# Patient Record
Sex: MALE | Race: BLACK OR AFRICAN AMERICAN | NOT HISPANIC OR LATINO | Employment: FULL TIME | URBAN - METROPOLITAN AREA
[De-identification: names, ages, dates, MRNs, and addresses within clinical notes are randomized per-mention and may not be internally consistent; named-entity substitution may affect disease eponyms.]

---

## 2023-12-07 ENCOUNTER — HOSPITAL ENCOUNTER (EMERGENCY)
Facility: HOSPITAL | Age: 33
Discharge: HOME | End: 2023-12-08
Attending: EMERGENCY MEDICINE
Payer: MEDICAID

## 2023-12-07 VITALS
OXYGEN SATURATION: 97 % | HEIGHT: 71 IN | WEIGHT: 150 LBS | HEART RATE: 94 BPM | RESPIRATION RATE: 16 BRPM | TEMPERATURE: 100 F | SYSTOLIC BLOOD PRESSURE: 134 MMHG | BODY MASS INDEX: 21 KG/M2 | DIASTOLIC BLOOD PRESSURE: 91 MMHG

## 2023-12-07 DIAGNOSIS — Z11.51 ENCOUNTER FOR SCREENING FOR HUMAN PAPILLOMAVIRUS (HPV): Primary | ICD-10-CM

## 2023-12-07 PROCEDURE — 99283 EMERGENCY DEPT VISIT LOW MDM: CPT | Performed by: EMERGENCY MEDICINE

## 2023-12-07 PROCEDURE — 99281 EMR DPT VST MAYX REQ PHY/QHP: CPT | Performed by: EMERGENCY MEDICINE

## 2023-12-07 ASSESSMENT — COLUMBIA-SUICIDE SEVERITY RATING SCALE - C-SSRS
2. HAVE YOU ACTUALLY HAD ANY THOUGHTS OF KILLING YOURSELF?: NO
6. HAVE YOU EVER DONE ANYTHING, STARTED TO DO ANYTHING, OR PREPARED TO DO ANYTHING TO END YOUR LIFE?: NO
1. IN THE PAST MONTH, HAVE YOU WISHED YOU WERE DEAD OR WISHED YOU COULD GO TO SLEEP AND NOT WAKE UP?: NO

## 2023-12-08 NOTE — ED PROVIDER NOTES
HPI:  Patient is a 33-year-old male who presents requesting HPV testing.  Patient states his girlfriend had a positive HPV test with numerous strains on her recent Pap smear and requested for him to get tested for this.  He denies hematuria, dysuria or penile discharge.  No back pain.  No genital warts or other lesions.    ROS: A 10-system ROS was performed and was negative except as documented in the HPI.    PMH/PSH: Reviewed in EMR. As above in HPI.  SH: Denies EtOH, tobacco or illicit drug use.  Allergies: No Known Allergies   Medications: See prescription writer for full medication list.     General: no acute distress, appropriate conversation  HEENT:  No rhinorrhea. MMM.  Cardiac: regular rate rhythm, no murmurs  Pulm:  normal respiratory effort on room air, equal chest expansion, clear bilaterally, no wheeze or crackles  GI: soft, nontender, nondistended, +BS  : deferred  Extremities:  moves all extremities freely, no edema noted  Skin: warm, well-perfused, no lesions noted on exposed skin.  Neuro:  AOx3, moves all 4 extremities freely and independently     Assessment/Plan/MDM  Patient is a 33-year-old male who presents requesting HPV testing.  Patient advised that we d do not provide this testing from the emergency department.  He recently had STI, HIV and syphilis testing at urgent care center all of which were negative.  Patient discharged in stable condition with HPV education.    ED Course/Progress:    Diagnoses as of 12/09/23 2345   Encounter for screening for human papillomavirus (HPV)        Clinical Impression: as above  Dispo:   Home: I discussed the differential, results and discharge plan with the patient. I explained reasons for the patient to return to the Emergency Department.  Questions were addressed.  They understand return precautions and discharge instructions. The patient expressed understanding and agreement with assessment/plan.     Pt seen and discussed with attending physician,   .  Lacie Sewell MD  PGY3, Emergency Medicine    Disclaimer: This note was dictated by speech recognition. An attempt at proof reading was made to minimize errors. Errors in transcription may be present.  Please call if questions.      Lacie Sewell MD  Resident  12/09/23 6922

## 2023-12-08 NOTE — DISCHARGE INSTRUCTIONS
We do not offer HPV testing from the emergency department.  HPV is more concerning in women however everyone is a carrier.  You do not need to follow-up with urology, as you do not have any symptoms.

## 2024-04-09 ENCOUNTER — HOSPITAL ENCOUNTER (EMERGENCY)
Facility: HOSPITAL | Age: 34
Discharge: HOME | End: 2024-04-09
Attending: STUDENT IN AN ORGANIZED HEALTH CARE EDUCATION/TRAINING PROGRAM
Payer: MEDICAID

## 2024-04-09 VITALS
HEIGHT: 71 IN | BODY MASS INDEX: 23.1 KG/M2 | TEMPERATURE: 99.3 F | SYSTOLIC BLOOD PRESSURE: 143 MMHG | OXYGEN SATURATION: 96 % | DIASTOLIC BLOOD PRESSURE: 95 MMHG | HEART RATE: 93 BPM | RESPIRATION RATE: 18 BRPM | WEIGHT: 165 LBS

## 2024-04-09 DIAGNOSIS — M79.604 RIGHT LEG PAIN: Primary | ICD-10-CM

## 2024-04-09 DIAGNOSIS — Z87.39 H/O RADICULOPATHY: ICD-10-CM

## 2024-04-09 PROCEDURE — 2500000004 HC RX 250 GENERAL PHARMACY W/ HCPCS (ALT 636 FOR OP/ED): Mod: SE

## 2024-04-09 PROCEDURE — 99283 EMERGENCY DEPT VISIT LOW MDM: CPT

## 2024-04-09 PROCEDURE — 99284 EMERGENCY DEPT VISIT MOD MDM: CPT

## 2024-04-09 PROCEDURE — 96372 THER/PROPH/DIAG INJ SC/IM: CPT

## 2024-04-09 PROCEDURE — 2500000001 HC RX 250 WO HCPCS SELF ADMINISTERED DRUGS (ALT 637 FOR MEDICARE OP): Mod: SE

## 2024-04-09 RX ORDER — BACLOFEN 10 MG/1
5 TABLET ORAL ONCE
Status: COMPLETED | OUTPATIENT
Start: 2024-04-09 | End: 2024-04-09

## 2024-04-09 RX ORDER — BACLOFEN 10 MG/1
5 TABLET ORAL 3 TIMES DAILY
Qty: 21 TABLET | Refills: 0 | Status: SHIPPED | OUTPATIENT
Start: 2024-04-09 | End: 2024-04-23

## 2024-04-09 RX ORDER — KETOROLAC TROMETHAMINE 30 MG/ML
30 INJECTION, SOLUTION INTRAMUSCULAR; INTRAVENOUS ONCE
Status: COMPLETED | OUTPATIENT
Start: 2024-04-09 | End: 2024-04-09

## 2024-04-09 RX ADMIN — BACLOFEN 5 MG: 10 TABLET ORAL at 23:40

## 2024-04-09 RX ADMIN — KETOROLAC TROMETHAMINE 30 MG: 30 INJECTION, SOLUTION INTRAMUSCULAR; INTRAVENOUS at 21:14

## 2024-04-09 ASSESSMENT — COLUMBIA-SUICIDE SEVERITY RATING SCALE - C-SSRS
2. HAVE YOU ACTUALLY HAD ANY THOUGHTS OF KILLING YOURSELF?: NO
1. IN THE PAST MONTH, HAVE YOU WISHED YOU WERE DEAD OR WISHED YOU COULD GO TO SLEEP AND NOT WAKE UP?: NO
6. HAVE YOU EVER DONE ANYTHING, STARTED TO DO ANYTHING, OR PREPARED TO DO ANYTHING TO END YOUR LIFE?: NO

## 2024-04-09 ASSESSMENT — LIFESTYLE VARIABLES
EVER FELT BAD OR GUILTY ABOUT YOUR DRINKING: NO
TOTAL SCORE: 0
HAVE YOU EVER FELT YOU SHOULD CUT DOWN ON YOUR DRINKING: NO
HAVE PEOPLE ANNOYED YOU BY CRITICIZING YOUR DRINKING: NO
EVER HAD A DRINK FIRST THING IN THE MORNING TO STEADY YOUR NERVES TO GET RID OF A HANGOVER: NO

## 2024-04-09 NOTE — Clinical Note
Hosea Whiting was seen and treated in our emergency department on 4/9/2024.  He may return to work on 04/12/2024.       If you have any questions or concerns, please don't hesitate to call.      Dinh Gilman MD
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
## 2024-04-10 RX ORDER — CAPSAICIN AND MENTHOL .0225; 4.5 G/1; G/1
1 PATCH TOPICAL DAILY
Qty: 14 PATCH | Refills: 0 | Status: SHIPPED | OUTPATIENT
Start: 2024-04-10
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
## 2024-04-10 NOTE — DISCHARGE INSTRUCTIONS
Follow-up with the physician referral line at [phone], request for physical medicine.  Follow-up with the orthopedic clinic at [phone].  Take your baclofen as prescribed.
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
## 2024-06-17 ENCOUNTER — APPOINTMENT (OUTPATIENT)
Dept: RADIOLOGY | Facility: HOSPITAL | Age: 34
End: 2024-06-17
Payer: MEDICAID

## 2024-06-17 ENCOUNTER — HOSPITAL ENCOUNTER (EMERGENCY)
Facility: HOSPITAL | Age: 34
Discharge: LEFT BEFORE DISCHARGE | End: 2024-06-17
Attending: STUDENT IN AN ORGANIZED HEALTH CARE EDUCATION/TRAINING PROGRAM
Payer: MEDICAID

## 2024-06-17 VITALS
WEIGHT: 165 LBS | HEIGHT: 71 IN | TEMPERATURE: 98.5 F | HEART RATE: 75 BPM | DIASTOLIC BLOOD PRESSURE: 72 MMHG | SYSTOLIC BLOOD PRESSURE: 115 MMHG | OXYGEN SATURATION: 99 % | RESPIRATION RATE: 16 BRPM | BODY MASS INDEX: 23.1 KG/M2

## 2024-06-17 DIAGNOSIS — F10.920 ALCOHOLIC INTOXICATION WITHOUT COMPLICATION (CMS-HCC): Primary | ICD-10-CM

## 2024-06-17 LAB
ALBUMIN SERPL BCP-MCNC: 4.5 G/DL (ref 3.4–5)
ALP SERPL-CCNC: 72 U/L (ref 33–120)
ALT SERPL W P-5'-P-CCNC: 13 U/L (ref 10–52)
ANION GAP SERPL CALC-SCNC: 16 MMOL/L (ref 10–20)
AST SERPL W P-5'-P-CCNC: 21 U/L (ref 9–39)
BASOPHILS # BLD AUTO: 0.02 X10*3/UL (ref 0–0.1)
BASOPHILS NFR BLD AUTO: 0.4 %
BILIRUB SERPL-MCNC: 0.4 MG/DL (ref 0–1.2)
BUN SERPL-MCNC: 7 MG/DL (ref 6–23)
CALCIUM SERPL-MCNC: 9.5 MG/DL (ref 8.6–10.6)
CHLORIDE SERPL-SCNC: 108 MMOL/L (ref 98–107)
CO2 SERPL-SCNC: 24 MMOL/L (ref 21–32)
CREAT SERPL-MCNC: 0.9 MG/DL (ref 0.5–1.3)
EGFRCR SERPLBLD CKD-EPI 2021: >90 ML/MIN/1.73M*2
EOSINOPHIL # BLD AUTO: 0.17 X10*3/UL (ref 0–0.7)
EOSINOPHIL NFR BLD AUTO: 3.1 %
ERYTHROCYTE [DISTWIDTH] IN BLOOD BY AUTOMATED COUNT: 13.9 % (ref 11.5–14.5)
GLUCOSE SERPL-MCNC: 97 MG/DL (ref 74–99)
HCT VFR BLD AUTO: 42.4 % (ref 41–52)
HGB BLD-MCNC: 15 G/DL (ref 13.5–17.5)
IMM GRANULOCYTES # BLD AUTO: 0.02 X10*3/UL (ref 0–0.7)
IMM GRANULOCYTES NFR BLD AUTO: 0.4 % (ref 0–0.9)
LYMPHOCYTES # BLD AUTO: 1.67 X10*3/UL (ref 1.2–4.8)
LYMPHOCYTES NFR BLD AUTO: 30.2 %
MAGNESIUM SERPL-MCNC: 2.28 MG/DL (ref 1.6–2.4)
MCH RBC QN AUTO: 31.8 PG (ref 26–34)
MCHC RBC AUTO-ENTMCNC: 35.4 G/DL (ref 32–36)
MCV RBC AUTO: 90 FL (ref 80–100)
MONOCYTES # BLD AUTO: 0.47 X10*3/UL (ref 0.1–1)
MONOCYTES NFR BLD AUTO: 8.5 %
NEUTROPHILS # BLD AUTO: 3.18 X10*3/UL (ref 1.2–7.7)
NEUTROPHILS NFR BLD AUTO: 57.4 %
NRBC BLD-RTO: 0 /100 WBCS (ref 0–0)
PHOSPHATE SERPL-MCNC: 4.8 MG/DL (ref 2.5–4.9)
PLATELET # BLD AUTO: 239 X10*3/UL (ref 150–450)
POTASSIUM SERPL-SCNC: 3.8 MMOL/L (ref 3.5–5.3)
PROT SERPL-MCNC: 6.8 G/DL (ref 6.4–8.2)
RBC # BLD AUTO: 4.72 X10*6/UL (ref 4.5–5.9)
SODIUM SERPL-SCNC: 144 MMOL/L (ref 136–145)
WBC # BLD AUTO: 5.5 X10*3/UL (ref 4.4–11.3)

## 2024-06-17 PROCEDURE — 70450 CT HEAD/BRAIN W/O DYE: CPT

## 2024-06-17 PROCEDURE — 73502 X-RAY EXAM HIP UNI 2-3 VIEWS: CPT | Mod: LEFT SIDE | Performed by: RADIOLOGY

## 2024-06-17 PROCEDURE — 73502 X-RAY EXAM HIP UNI 2-3 VIEWS: CPT | Mod: LT

## 2024-06-17 PROCEDURE — 36415 COLL VENOUS BLD VENIPUNCTURE: CPT | Performed by: STUDENT IN AN ORGANIZED HEALTH CARE EDUCATION/TRAINING PROGRAM

## 2024-06-17 PROCEDURE — 80053 COMPREHEN METABOLIC PANEL: CPT | Performed by: STUDENT IN AN ORGANIZED HEALTH CARE EDUCATION/TRAINING PROGRAM

## 2024-06-17 PROCEDURE — 73564 X-RAY EXAM KNEE 4 OR MORE: CPT | Mod: RIGHT SIDE | Performed by: RADIOLOGY

## 2024-06-17 PROCEDURE — 85025 COMPLETE CBC W/AUTO DIFF WBC: CPT | Performed by: STUDENT IN AN ORGANIZED HEALTH CARE EDUCATION/TRAINING PROGRAM

## 2024-06-17 PROCEDURE — 83735 ASSAY OF MAGNESIUM: CPT | Performed by: STUDENT IN AN ORGANIZED HEALTH CARE EDUCATION/TRAINING PROGRAM

## 2024-06-17 PROCEDURE — 70450 CT HEAD/BRAIN W/O DYE: CPT | Performed by: RADIOLOGY

## 2024-06-17 PROCEDURE — 73564 X-RAY EXAM KNEE 4 OR MORE: CPT | Mod: RT

## 2024-06-17 PROCEDURE — 84100 ASSAY OF PHOSPHORUS: CPT | Performed by: STUDENT IN AN ORGANIZED HEALTH CARE EDUCATION/TRAINING PROGRAM

## 2024-06-17 PROCEDURE — 99284 EMERGENCY DEPT VISIT MOD MDM: CPT

## 2024-06-17 ASSESSMENT — PAIN DESCRIPTION - PAIN TYPE: TYPE: ACUTE PAIN

## 2024-06-17 ASSESSMENT — LIFESTYLE VARIABLES
EVER HAD A DRINK FIRST THING IN THE MORNING TO STEADY YOUR NERVES TO GET RID OF A HANGOVER: NO
TOTAL SCORE: 0
EVER FELT BAD OR GUILTY ABOUT YOUR DRINKING: NO
HAVE PEOPLE ANNOYED YOU BY CRITICIZING YOUR DRINKING: NO
HAVE YOU EVER FELT YOU SHOULD CUT DOWN ON YOUR DRINKING: NO

## 2024-06-17 ASSESSMENT — PAIN SCALES - GENERAL: PAINLEVEL_OUTOF10: 8

## 2024-06-17 ASSESSMENT — PAIN - FUNCTIONAL ASSESSMENT: PAIN_FUNCTIONAL_ASSESSMENT: 0-10

## 2024-06-17 ASSESSMENT — PAIN DESCRIPTION - ORIENTATION: ORIENTATION: RIGHT

## 2024-06-17 ASSESSMENT — COLUMBIA-SUICIDE SEVERITY RATING SCALE - C-SSRS
1. IN THE PAST MONTH, HAVE YOU WISHED YOU WERE DEAD OR WISHED YOU COULD GO TO SLEEP AND NOT WAKE UP?: NO
6. HAVE YOU EVER DONE ANYTHING, STARTED TO DO ANYTHING, OR PREPARED TO DO ANYTHING TO END YOUR LIFE?: NO
2. HAVE YOU ACTUALLY HAD ANY THOUGHTS OF KILLING YOURSELF?: NO

## 2024-06-17 ASSESSMENT — PAIN DESCRIPTION - LOCATION: LOCATION: HIP

## 2024-06-17 ASSESSMENT — PAIN DESCRIPTION - PROGRESSION: CLINICAL_PROGRESSION: NOT CHANGED
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
## 2024-06-17 NOTE — ED PROVIDER NOTES
CC: Acute Intoxication     HPI:  Hosea Whiting is a 34 y.o. male with a past medical history of multiple surgical procedures on bilateral lower extremities, remote, presenting to the emergency department today due to concerns for intoxication.  Per EMS report, the patient called 911 due to concerns that his right leg was giving out.  Patient was initially alert and oriented x 4 and responsive however became somnolent and alert and oriented x 1 and wrapped.  Per EMS, this all happened after the patient had several shots of alcohol and used marijuana.  Patient states that he is not able to speak or communicate.  He states that this occurred and arrived to the hospital.  He states that this is never happened before.  He states that he is having pain over his right lower extremity.  He states he feels as though his knee gave out on him.  Denies any recent fevers, chills, nausea, vomiting.  States that he did not injure himself when his leg gave out on him.  Denies any numbness, tingling, weakness.  Denies any history of high blood pressure, diabetes, or other concerns.    Limitations to History:  Patient difficulty forming words communication was done via written paper  Additional History provided by: N/A    External Records Reviewed:  Recent available ED and inpatient notes reviewed in EMR.    PMHx/PSHx:  Per HPI.   -does not have a problem list on file.    - has no past surgical history on file.    Medications:  Reviewed in EMR. See EMR for complete list of medications and doses.    Allergies:  Amoxicillin, Codeine hcl, and Fish derived    Social History:  - Tobacco:  has no history on file for tobacco use.   - Alcohol:  has no history on file for alcohol use.   - Illicit Drugs:  has no history on file for drug use.     ROS:  Per HPI.     ???????????????????????????????????????????????????????????????  Triage Vitals:  T 36.9 °C (98.5 °F)    BP (!) 144/94  RR 16  O2 96 % None (Room air)    Physical  Exam  Vitals and nursing note reviewed.   Constitutional:       General: He is not in acute distress.     Appearance: He is well-developed.   HENT:      Head: Normocephalic and atraumatic.   Eyes:      Conjunctiva/sclera: Conjunctivae normal.   Cardiovascular:      Rate and Rhythm: Normal rate and regular rhythm.      Heart sounds: No murmur heard.  Pulmonary:      Effort: Pulmonary effort is normal. No respiratory distress.      Breath sounds: Normal breath sounds.   Abdominal:      Palpations: Abdomen is soft.      Tenderness: There is no abdominal tenderness.   Musculoskeletal:         General: Tenderness (Mild tenderness over the knee with full range of motion) present. No swelling, deformity or signs of injury.      Cervical back: Neck supple.      Right lower leg: No edema.      Left lower leg: No edema.   Skin:     General: Skin is warm and dry.      Capillary Refill: Capillary refill takes less than 2 seconds.   Neurological:      Mental Status: He is alert and oriented to person, place, and time.      Comments: VAN:   negative    NIH Stroke Scale  1a - Level of consciousness: Alert/keenly responsive +0  1b - Ask month/age: Both questions correct + 0  1c - Blink and squeeze hands: Both tasks + 0  2 - Horizontal Eye Movements: Normal + 0  3 - Visual Fields: Normal + 0  4 - Facial Palsy: Normal + 0  5a - Left Upper Extremity: No drift for 10 seconds +0  5b - Right Upper Extremity: No drift for 10 seconds +0  6a - Left Lower Extremity: No drift for 10 seconds +0  6b - Right Lower Extremity: No drift for 10 seconds +0  7 - Ataxia: No ataxia +0  8 - Sensation: Normal +0  9 - Aphasia: Normal +0  10 - Dysarthria: Severe dysarthria: unintelligible slurring or out of proportion to dysphasia +2  11 - Extinction/inattention: Normal +0  Total NIHSS: 2     Psychiatric:         Mood and Affect: Mood normal.       ???????????????????????????????????????????????????????????????  ED Course:  Diagnoses as of 06/19/24 3770    Alcoholic intoxication without complication (CMS-[facility])       EKG & Images:  Independently reviewed, See ED Course      MDM:  -Patient is a 36-year-old male with a past medical history of multiple surgical procedures in bilateral lower extremities presenting to the emergency department today due to right lower extremity pain as well as difficulty communicating.  Patient states that he was having some alcohol and marijuana.  He then felt as though his right leg gave out on him.  He feels as though he is having pain over his knee.  He is also now having difficulty forming words.  When trying to communicate, patient is able to adequately communicate via writing.  He is having no word finding difficulty.  He is occasionally able to state some words clearly however most words come out as garbled/dysarthric speech.  This could be secondary to slurred speech while intoxicated however full neurologic exam was done and no localizing deficits were found to suggest cerebrovascular etiology.  Will obtain CT head to rule out any other pathologies.  Regarding his knee pain, when distracted, patient has full range of motion of the knee and hip.  However states that he has significant pain there.  Will obtain x-rays however given the extensive well-healed scars throughout his legs, suspect that this is likely degenerative changes in the setting of multiple surgical repairs.  Following this, patient was signed out to the oncoming provider pending images, and reevaluation.  Please see the oncoming providers note for further details.    Final diagnoses:   [F10.920] Alcoholic intoxication without complication (CMS-HCC)         Social Determinants Limiting Care:  None identified    Disposition:  Handoff    Anna Grant MD   Emergency Medicine Resident, PGY3  [facility]     Disclaimer: This note was dictated by speech recognition. Minor errors in transcription may be present    Procedures ?  [employer] last updated 6/19/2024 5:08 PM        Anna Grant MD  Resident  06/19/24 9460

## 2024-06-24 ENCOUNTER — APPOINTMENT (OUTPATIENT)
Dept: ORTHOPEDIC SURGERY | Facility: HOSPITAL | Age: 34
End: 2024-06-24
Payer: MEDICAID

## 2024-07-15 ENCOUNTER — OFFICE VISIT (OUTPATIENT)
Dept: ORTHOPEDIC SURGERY | Facility: HOSPITAL | Age: 34
End: 2024-07-15

## 2024-07-15 ENCOUNTER — HOSPITAL ENCOUNTER (OUTPATIENT)
Dept: RADIOLOGY | Facility: HOSPITAL | Age: 34
Discharge: HOME | End: 2024-07-15

## 2024-07-15 DIAGNOSIS — S82.401S TIBIA AND FIBULA OPEN FRACTURE, RIGHT, SEQUELA: ICD-10-CM

## 2024-07-15 DIAGNOSIS — S82.201S TIBIA AND FIBULA OPEN FRACTURE, RIGHT, SEQUELA: ICD-10-CM

## 2024-07-15 PROCEDURE — 73590 X-RAY EXAM OF LOWER LEG: CPT | Mod: 50

## 2024-07-15 PROCEDURE — 99214 OFFICE O/P EST MOD 30 MIN: CPT | Performed by: ORTHOPAEDIC SURGERY

## 2024-07-15 NOTE — PROGRESS NOTES
Chief complaint my legs are having spasms, they will not relax and I get spasms at night the right is worse than the left.    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  He has inability to run now which has worsened over the past 6 months.  He cannot relax at night and then when he goes to sleep he wakes up in spasm.  He was seen by neurology and had a normal EMG.  He has a history from me of 8 years ago having bilateral tibia fractures left treated with nailing the right treated with open reduction of the plateau and the ring fixator at the leg.  He was doing quite well really up until a year ago when this started.  Wondering what was going on.    His physical examination reveals that both his lower extremities at the knees and at the hips have almost cogwheel rigidity.  He is unable to relax.  When I try to bend his knee I can force it and then it will relax but it takes pretty good force.  His ankles are the same.  When I try to force him into dorsiflexion his muscle spasm until he relaxes and then I can put him in about 20 degrees of dorsiflexion he goes to about 40 degrees of plantarflexion but he has muscle spasms in between and constant.  It is the same on both sides.    X-rays of his right tibia were done today which show his tibia to be well aligned he has no evidence of arthritis of his knee or ankle.  Bone is anatomically aligned without lesions or nonunions or malunions.  No hardware except for 2 screws at the tibial plateau which are well buried.    Assessment this is not a arthritis or bone problemAnd I do not think it is a regional problem.  Appears to be more of a progressive lower extremity muscle spasm problem with etiology unknown.  I am concerned that this may be a primary neurological problem although he is very young for Parkinson's, progressive supranuclear palsy, multiple systems atrophy, cortical basilar  degeneration etc.    Plan just to be on the safe side as far as spasms from overuse I told him to make sure he stays hydrated when he is out on hot days.  I have also recommended magnesium 4 to 500 mg at night may help with spasms.  But I think he needs to follow-up with neurology.  I understand that the EMG was normal but I think he needs follow-up his physical examination is completely abnormal in his lower extremities and I am concerned that this may be a central etiology.  I do not believe it is related directly to his previous tibial injuries.  Although I think the right side is slightly worse because of the slight weakness and muscle on the right as opposed to the left